# Patient Record
Sex: FEMALE | Race: WHITE | NOT HISPANIC OR LATINO | Employment: OTHER | URBAN - METROPOLITAN AREA
[De-identification: names, ages, dates, MRNs, and addresses within clinical notes are randomized per-mention and may not be internally consistent; named-entity substitution may affect disease eponyms.]

---

## 2024-06-18 ENCOUNTER — OFFICE VISIT (OUTPATIENT)
Dept: OBSTETRICS AND GYNECOLOGY | Facility: CLINIC | Age: 29
End: 2024-06-18
Payer: MEDICAID

## 2024-06-18 VITALS — WEIGHT: 129 LBS | SYSTOLIC BLOOD PRESSURE: 110 MMHG | RESPIRATION RATE: 16 BRPM | DIASTOLIC BLOOD PRESSURE: 70 MMHG

## 2024-06-18 DIAGNOSIS — Z30.432 ENCOUNTER FOR REMOVAL OF INTRAUTERINE CONTRACEPTIVE DEVICE (IUD): Primary | ICD-10-CM

## 2024-06-18 NOTE — PROGRESS NOTES
Subjective   Chief Complaint   Patient presents with    Gynecologic Exam     Wants IUD removed, desires pregnancy     Kiley Mendez is a 28 y.o. year old .  Patient's last menstrual period was 2024.  She presents to be seen because of desires IUD removal to pursue pregnancy. She had a vaginal delivery in May 2022, but reports it took a while to get pregnant. Discussed cycle tracking and ensured she is currently taking a prenatal vitamin.     OTHER THINGS SHE WANTS TO DISCUSS TODAY:  Nothing else    The following portions of the patient's history were reviewed and updated as appropriate:current medications, allergies, past family history, and past social history    Social History    Tobacco Use      Smoking status: Never      Smokeless tobacco: Not on file      Review of Systems        Objective   /70   Resp 16   Wt 58.5 kg (129 lb)   LMP 2024     Physical Exam  Vitals reviewed. Exam conducted with a chaperone present.         Lab Review   No data reviewed    Imaging IUD Removal    Date of procedure:  2024    Risks and benefits discussed? yes  All questions answered? yes  Consents given by The patient  Written consent obtained? yes  Reason for removal: Desires pregnancy    Local anesthesia used:  no    Procedure documentation:    A speculum was placed in order to view the cervix.  A tenaculum did not need to be placed on the anterior cervical lip.  Cervical dilation did not need to be performed in order to access the string.  The IUD string was easily seen.  The string was grasped and the IUD was removed without difficulty.  The IUD did not appear to be adherent to the uterine cavity. It was removed intact.    She tolerated the procedure without any difficulty.     Post procedure instructions: Patient notified to call with heavy bleeding, fever or increasing pain.    Follow up for annual exam 1 year    This note was electronically signed.    Indiana Brenstein, APRN    [place]osa  18, 2024    No data reviewed        Assessment & Plan   Diagnoses and all orders for this visit:    1. Encounter for removal of intrauterine contraceptive device (IUD) (Primary)        The importance of keeping all planned follow-up and taking all medications as prescribed was emphasized.    Return in about 1 year (around 6/18/2025) for Annual physical.    No orders of the defined types were placed in this encounter.                This note was electronically signed.    Indiana Bernstein, OSWALDO  June 18, 2024

## 2024-06-24 ENCOUNTER — PATIENT ROUNDING (BHMG ONLY) (OUTPATIENT)
Dept: OBSTETRICS AND GYNECOLOGY | Facility: CLINIC | Age: 29
End: 2024-06-24
Payer: MEDICAID

## 2024-06-24 NOTE — PROGRESS NOTES
My name is Ivis, clinical manager    I am with [facility]  [facility]  [address]  [facility] [address] [phone]    I want to officially welcome you to our practice and ask about your recent visit.    Tell me about your visit with us.  What things went well?    We're always looking for ways to make our patients' experiences even better. Do you have recommendations on way we may improve?    Overall were you satisfied with your first visit to our practice?    I appreciate you taking the time to answer a few questions today. Is there anything else I can do for you?    Thank you, and have a great day.

## 2024-06-24 NOTE — PROGRESS NOTES
My name is Ivis, clinical manager    I am with [facility]  [facility]  [address]  [facility] [address] [phone]    I want to officially welcome you to our practice and ask about your recent visit.    Tell me about your visit with us.  What things went well?    We're always looking for ways to make our patients' experiences even better. Do you have recommendations on way we may improve?    Overall were you satisfied with your first visit to our practice?    I appreciate you taking the time to answer a few questions today. Is there anything else I can do for you?    Thank you, and have a great day.